# Patient Record
Sex: MALE | NOT HISPANIC OR LATINO | Employment: FULL TIME | URBAN - METROPOLITAN AREA
[De-identification: names, ages, dates, MRNs, and addresses within clinical notes are randomized per-mention and may not be internally consistent; named-entity substitution may affect disease eponyms.]

---

## 2023-10-17 DIAGNOSIS — Z00.00 ENCOUNTER FOR GENERAL ADULT MEDICAL EXAMINATION WITHOUT ABNORMAL FINDINGS: ICD-10-CM

## 2023-10-19 PROBLEM — I10 HYPERTENSION: Status: ACTIVE | Noted: 2023-10-19

## 2023-10-19 PROBLEM — E78.5 HLD (HYPERLIPIDEMIA): Status: ACTIVE | Noted: 2023-10-19

## 2023-10-19 PROBLEM — F32.A DEPRESSION: Status: ACTIVE | Noted: 2023-10-19

## 2023-10-19 PROBLEM — R53.83 FATIGUE: Status: ACTIVE | Noted: 2023-10-19

## 2023-10-19 RX ORDER — FINASTERIDE 1 MG/1
1 TABLET, FILM COATED ORAL DAILY
COMMUNITY
End: 2023-10-20 | Stop reason: SDUPTHER

## 2023-10-19 RX ORDER — ESCITALOPRAM OXALATE 10 MG/1
1 TABLET ORAL DAILY
COMMUNITY
Start: 2016-03-23 | End: 2023-10-20 | Stop reason: SDUPTHER

## 2023-10-19 RX ORDER — LISINOPRIL AND HYDROCHLOROTHIAZIDE 10; 12.5 MG/1; MG/1
1 TABLET ORAL DAILY
COMMUNITY
End: 2023-10-20 | Stop reason: SDUPTHER

## 2023-10-19 RX ORDER — TADALAFIL 10 MG/1
10 TABLET ORAL
COMMUNITY
Start: 2016-10-07 | End: 2023-10-20 | Stop reason: SDUPTHER

## 2023-10-19 RX ORDER — ALBUTEROL SULFATE 90 UG/1
AEROSOL, METERED RESPIRATORY (INHALATION)
COMMUNITY
Start: 2023-05-14 | End: 2023-11-08 | Stop reason: ALTCHOICE

## 2023-10-20 ENCOUNTER — OFFICE VISIT (OUTPATIENT)
Dept: PRIMARY CARE | Facility: CLINIC | Age: 53
End: 2023-10-20
Payer: COMMERCIAL

## 2023-10-20 VITALS
HEART RATE: 97 BPM | BODY MASS INDEX: 26.39 KG/M2 | WEIGHT: 205.6 LBS | DIASTOLIC BLOOD PRESSURE: 70 MMHG | HEIGHT: 74 IN | SYSTOLIC BLOOD PRESSURE: 112 MMHG | TEMPERATURE: 98 F

## 2023-10-20 DIAGNOSIS — Z13.1 SCREENING FOR DIABETES MELLITUS (DM): ICD-10-CM

## 2023-10-20 DIAGNOSIS — Z12.5 ENCOUNTER FOR PROSTATE CANCER SCREENING: ICD-10-CM

## 2023-10-20 DIAGNOSIS — Z13.220 SCREENING, LIPID: ICD-10-CM

## 2023-10-20 DIAGNOSIS — I10 PRIMARY HYPERTENSION: ICD-10-CM

## 2023-10-20 DIAGNOSIS — N52.9 ERECTILE DISORDER: ICD-10-CM

## 2023-10-20 DIAGNOSIS — Z13.1 SCREENING FOR DIABETES MELLITUS: ICD-10-CM

## 2023-10-20 DIAGNOSIS — Z12.11 ENCOUNTER FOR SCREENING FOR MALIGNANT NEOPLASM OF COLON: Primary | ICD-10-CM

## 2023-10-20 DIAGNOSIS — Z13.29 SCREENING FOR THYROID DISORDER: ICD-10-CM

## 2023-10-20 DIAGNOSIS — F32.A DEPRESSION, UNSPECIFIED DEPRESSION TYPE: ICD-10-CM

## 2023-10-20 PROCEDURE — 3078F DIAST BP <80 MM HG: CPT | Performed by: FAMILY MEDICINE

## 2023-10-20 PROCEDURE — 3074F SYST BP LT 130 MM HG: CPT | Performed by: FAMILY MEDICINE

## 2023-10-20 PROCEDURE — 99214 OFFICE O/P EST MOD 30 MIN: CPT | Performed by: FAMILY MEDICINE

## 2023-10-20 PROCEDURE — 1036F TOBACCO NON-USER: CPT | Performed by: FAMILY MEDICINE

## 2023-10-20 RX ORDER — ASPIRIN 81 MG/1
81 TABLET ORAL
COMMUNITY
Start: 2018-09-27

## 2023-10-20 RX ORDER — FINASTERIDE 1 MG/1
1 TABLET, FILM COATED ORAL DAILY
Qty: 90 TABLET | Refills: 1 | Status: SHIPPED | OUTPATIENT
Start: 2023-10-20 | End: 2023-12-04

## 2023-10-20 RX ORDER — ESCITALOPRAM OXALATE 10 MG/1
10 TABLET ORAL DAILY
Qty: 90 TABLET | Refills: 1 | Status: SHIPPED | OUTPATIENT
Start: 2023-10-20 | End: 2024-10-19

## 2023-10-20 RX ORDER — TADALAFIL 20 MG/1
20 TABLET ORAL DAILY PRN
Qty: 30 TABLET | Refills: 10 | Status: SHIPPED | OUTPATIENT
Start: 2023-10-20 | End: 2024-10-19

## 2023-10-20 RX ORDER — BUSPIRONE HYDROCHLORIDE 15 MG/1
15 TABLET ORAL DAILY
Qty: 90 TABLET | Refills: 1 | Status: SHIPPED | OUTPATIENT
Start: 2023-10-20 | End: 2024-10-19

## 2023-10-20 RX ORDER — LISINOPRIL AND HYDROCHLOROTHIAZIDE 10; 12.5 MG/1; MG/1
1 TABLET ORAL DAILY
Qty: 90 TABLET | Refills: 1 | Status: SHIPPED | OUTPATIENT
Start: 2023-10-20 | End: 2024-04-30 | Stop reason: SDUPTHER

## 2023-10-20 RX ORDER — PANTOPRAZOLE SODIUM 40 MG/1
TABLET, DELAYED RELEASE ORAL
COMMUNITY
Start: 2008-12-03 | End: 2023-11-08 | Stop reason: ALTCHOICE

## 2023-10-20 NOTE — PROGRESS NOTES
"    /70 (BP Location: Right arm, Patient Position: Sitting, BP Cuff Size: Adult)   Pulse 97   Temp 36.7 °C (98 °F) (Skin)   Ht 1.88 m (6' 2\")   Wt 93.3 kg (205 lb 9.6 oz)   BMI 26.40 kg/m²     No past medical history on file.    Patient Active Problem List   Diagnosis    Depression    Fatigue    HLD (hyperlipidemia)    Hypertension       Current Outpatient Medications   Medication Sig Dispense Refill    escitalopram (Lexapro) 10 mg tablet Take 1 tablet (10 mg) by mouth once daily.      finasteride (Propecia) 1 mg tablet Take 1 tablet (1 mg) by mouth once daily.      lisinopriL-hydrochlorothiazide 10-12.5 mg tablet Take 1 tablet by mouth once daily.      tadalafil (Cialis) 10 mg tablet Take 1 tablet (10 mg) by mouth. 1 hour before needed      albuterol 90 mcg/actuation inhaler INHALE 2 PUFFS BY MOUTH EVERY 4 TO 6 HOURS AS NEEDED FOR WHEEZE AND FOR SHORTNESS OF BREATH      aspirin 81 mg EC tablet 1 tablet (81 mg).      pantoprazole (Protonix) 40 mg EC tablet Take by mouth.       No current facility-administered medications for this visit.       CC/HPI/ASSESSMENT/PLAN    CC follow-up medicine    HPI patient with a history of hypertension, depression, erectile dysfunction, GERD.  Patient request refills for medication.  Patient will need colonoscopy.  Denies fever chills chest pain.  We discussed adding BuSpar as needed for anxiety which may also help with erectile dysfunction if related to side effect of Lexapro.  Notes overall doing well with medications otherwise.  Blood pressure under good control.  Patient like an order for blood work.  ROS negative some noted above past medical social surgical history is reviewed    Exam calm vital stable eyes no jaundice neck supple no carotid bruit lungs CTA CV RRR Ext no edema skin no rash psych calm pleasant male no psychosis neuro alert and oriented no focal neurologic deficits noted    A/P 1.  Hypertension chronic stable 2 depression chronic stable 3 erectile " dysfunction chronic.  Medications refilled.  BuSpar as ordered.  Blood work is ordered.  He will see GI consultation for endoscopy.  Follow-up 6 months    There are no diagnoses linked to this encounter.

## 2023-10-25 PROBLEM — N52.9 ERECTILE DISORDER: Status: ACTIVE | Noted: 2023-10-25

## 2023-11-08 ENCOUNTER — TELEPHONE (OUTPATIENT)
Dept: PRIMARY CARE | Facility: CLINIC | Age: 53
End: 2023-11-08

## 2023-11-08 ENCOUNTER — OFFICE VISIT (OUTPATIENT)
Dept: GASTROENTEROLOGY | Facility: CLINIC | Age: 53
End: 2023-11-08
Payer: COMMERCIAL

## 2023-11-08 ENCOUNTER — LAB (OUTPATIENT)
Dept: LAB | Facility: LAB | Age: 53
End: 2023-11-08
Payer: COMMERCIAL

## 2023-11-08 VITALS
SYSTOLIC BLOOD PRESSURE: 123 MMHG | HEIGHT: 74 IN | WEIGHT: 206 LBS | BODY MASS INDEX: 26.44 KG/M2 | DIASTOLIC BLOOD PRESSURE: 82 MMHG | HEART RATE: 80 BPM

## 2023-11-08 DIAGNOSIS — Z12.11 ENCOUNTER FOR SCREENING FOR MALIGNANT NEOPLASM OF COLON: ICD-10-CM

## 2023-11-08 DIAGNOSIS — K62.5 RECTAL BLEEDING: Primary | ICD-10-CM

## 2023-11-08 DIAGNOSIS — K62.5 RECTAL BLEEDING: ICD-10-CM

## 2023-11-08 DIAGNOSIS — Z13.1 SCREENING FOR DIABETES MELLITUS: ICD-10-CM

## 2023-11-08 DIAGNOSIS — Z13.1 SCREENING FOR DIABETES MELLITUS (DM): ICD-10-CM

## 2023-11-08 DIAGNOSIS — Z13.29 SCREENING FOR THYROID DISORDER: ICD-10-CM

## 2023-11-08 DIAGNOSIS — Z12.5 ENCOUNTER FOR PROSTATE CANCER SCREENING: ICD-10-CM

## 2023-11-08 DIAGNOSIS — Z13.220 SCREENING, LIPID: ICD-10-CM

## 2023-11-08 LAB
ALBUMIN SERPL BCP-MCNC: 4.3 G/DL (ref 3.4–5)
ALP SERPL-CCNC: 56 U/L (ref 33–120)
ALT SERPL W P-5'-P-CCNC: 12 U/L (ref 10–52)
ANION GAP SERPL CALC-SCNC: 14 MMOL/L (ref 10–20)
AST SERPL W P-5'-P-CCNC: 14 U/L (ref 9–39)
BILIRUB SERPL-MCNC: 0.6 MG/DL (ref 0–1.2)
BUN SERPL-MCNC: 14 MG/DL (ref 6–23)
CALCIUM SERPL-MCNC: 9.3 MG/DL (ref 8.6–10.3)
CHLORIDE SERPL-SCNC: 102 MMOL/L (ref 98–107)
CHOLEST SERPL-MCNC: 156 MG/DL (ref 0–199)
CHOLESTEROL/HDL RATIO: 2.8
CO2 SERPL-SCNC: 28 MMOL/L (ref 21–32)
CREAT SERPL-MCNC: 0.85 MG/DL (ref 0.5–1.3)
CRP SERPL-MCNC: <0.1 MG/DL
ERYTHROCYTE [DISTWIDTH] IN BLOOD BY AUTOMATED COUNT: 11.9 % (ref 11.5–14.5)
ERYTHROCYTE [SEDIMENTATION RATE] IN BLOOD BY WESTERGREN METHOD: 2 MM/H (ref 0–20)
EST. AVERAGE GLUCOSE BLD GHB EST-MCNC: 103 MG/DL
FERRITIN SERPL-MCNC: 46 NG/ML (ref 20–300)
GFR SERPL CREATININE-BSD FRML MDRD: >90 ML/MIN/1.73M*2
GLUCOSE SERPL-MCNC: 89 MG/DL (ref 74–99)
HBA1C MFR BLD: 5.2 %
HCT VFR BLD AUTO: 45.2 % (ref 41–52)
HDLC SERPL-MCNC: 54.8 MG/DL
HGB BLD-MCNC: 15.5 G/DL (ref 13.5–17.5)
LDLC SERPL CALC-MCNC: 89 MG/DL
MCH RBC QN AUTO: 32.8 PG (ref 26–34)
MCHC RBC AUTO-ENTMCNC: 34.3 G/DL (ref 32–36)
MCV RBC AUTO: 96 FL (ref 80–100)
NON HDL CHOLESTEROL: 101 MG/DL (ref 0–149)
NRBC BLD-RTO: 0 /100 WBCS (ref 0–0)
PLATELET # BLD AUTO: 280 X10*3/UL (ref 150–450)
POTASSIUM SERPL-SCNC: 4.1 MMOL/L (ref 3.5–5.3)
PROT SERPL-MCNC: 6.3 G/DL (ref 6.4–8.2)
PSA SERPL-MCNC: 0.69 NG/ML
RBC # BLD AUTO: 4.72 X10*6/UL (ref 4.5–5.9)
SODIUM SERPL-SCNC: 140 MMOL/L (ref 136–145)
TRIGL SERPL-MCNC: 60 MG/DL (ref 0–149)
TSH SERPL-ACNC: 0.92 MIU/L (ref 0.44–3.98)
VLDL: 12 MG/DL (ref 0–40)
WBC # BLD AUTO: 5.9 X10*3/UL (ref 4.4–11.3)

## 2023-11-08 PROCEDURE — 82728 ASSAY OF FERRITIN: CPT

## 2023-11-08 PROCEDURE — 86140 C-REACTIVE PROTEIN: CPT

## 2023-11-08 PROCEDURE — 80053 COMPREHEN METABOLIC PANEL: CPT

## 2023-11-08 PROCEDURE — 84153 ASSAY OF PSA TOTAL: CPT

## 2023-11-08 PROCEDURE — 3079F DIAST BP 80-89 MM HG: CPT | Performed by: REGISTERED NURSE

## 2023-11-08 PROCEDURE — 83036 HEMOGLOBIN GLYCOSYLATED A1C: CPT

## 2023-11-08 PROCEDURE — 99203 OFFICE O/P NEW LOW 30 MIN: CPT | Performed by: REGISTERED NURSE

## 2023-11-08 PROCEDURE — 80061 LIPID PANEL: CPT

## 2023-11-08 PROCEDURE — 3074F SYST BP LT 130 MM HG: CPT | Performed by: REGISTERED NURSE

## 2023-11-08 PROCEDURE — 36415 COLL VENOUS BLD VENIPUNCTURE: CPT

## 2023-11-08 PROCEDURE — 85652 RBC SED RATE AUTOMATED: CPT

## 2023-11-08 PROCEDURE — 85027 COMPLETE CBC AUTOMATED: CPT

## 2023-11-08 PROCEDURE — 84443 ASSAY THYROID STIM HORMONE: CPT

## 2023-11-08 PROCEDURE — 1036F TOBACCO NON-USER: CPT | Performed by: REGISTERED NURSE

## 2023-11-08 RX ORDER — SODIUM, POTASSIUM,MAG SULFATES 17.5-3.13G
1 SOLUTION, RECONSTITUTED, ORAL ORAL EVERY 12 HOURS
Qty: 2 EACH | Refills: 0 | Status: SHIPPED | OUTPATIENT
Start: 2023-11-08

## 2023-11-08 ASSESSMENT — ENCOUNTER SYMPTOMS
CONSTIPATION: 0
MYALGIAS: 0
UNEXPECTED WEIGHT CHANGE: 0
RECTAL PAIN: 0
ABDOMINAL DISTENTION: 0
NERVOUS/ANXIOUS: 0
ANAL BLEEDING: 0
ENDOCRINE NEGATIVE: 1
JOINT SWELLING: 0
APPETITE CHANGE: 0
DIFFICULTY URINATING: 0
DIARRHEA: 0
EYE PAIN: 0
ARTHRALGIAS: 0
EYES NEGATIVE: 1
ABDOMINAL PAIN: 0
NAUSEA: 0
SHORTNESS OF BREATH: 0
COUGH: 0
VOMITING: 0
BLOOD IN STOOL: 1

## 2023-11-08 NOTE — PROGRESS NOTES
REASON FOR VISIT:  Patient presents for colon cancer screening and rectal bleeding. Denies mucus. Endorses clotting. Moves bowels every day.     HPI:  Sha Harris is a 53 y.o. male who presents for hematochezia since 2014.  States he had a colonoscopy at that time that showed hemorrhoids.  He endorses intermittent hematochezia and recently had an episode of blood that saturated through his underwear and pants.  He takes low-dose aspirin every day and occasionally takes ibuprofen or Aleve.      He denies rectal pain, abdominal pain, bloating, epigastric pain, early satiety, reflux, dysphagia nausea or vomiting.  His weight and appetite are stable.  He moves his bowels daily.  No change in bowel pattern.  He has occasional loose stool depending what he eats.  He denies constipation, urgency, nocturnal bowel movements or melena.   He is a vegetarian and exercises regularly.    Med list notable for - denies NSAID use other than ASA 81mg or blood thinners     Labs notable for  -last blood tests checked in 2020.  Patient has blood tests orders in the system and will complete them today    No fhx of CRC, other GI malignancies, IBD or celiac disease    Prev endoscopic eval:     >>Colon 6/5/2014 - Dr. Blair --moderate nonbleeding internal hemorrhoids were found.  No prolapse.  The examination was otherwise normal.  Advised to use Anusol rectal suppository for 10 days.    REVIEW OF SYSTEMS    Review of Systems   Constitutional:  Negative for appetite change and unexpected weight change.   HENT:  Negative for mouth sores.    Eyes: Negative.  Negative for pain and visual disturbance.   Respiratory:  Negative for cough and shortness of breath.    Cardiovascular:  Negative for chest pain.   Gastrointestinal:  Positive for blood in stool. Negative for abdominal distention, abdominal pain, anal bleeding, constipation, diarrhea, nausea, rectal pain and vomiting.   Endocrine: Negative.    Genitourinary:  Negative for difficulty  urinating.   Musculoskeletal:  Negative for arthralgias, joint swelling and myalgias.   Skin:  Negative for rash.   Allergic/Immunologic: Negative for environmental allergies and food allergies.   Psychiatric/Behavioral:  The patient is not nervous/anxious.         No Known Allergies    No past medical history on file.    Past Surgical History:   Procedure Laterality Date    APPENDECTOMY  03/08/2017    Appendectomy    TONSILLECTOMY  03/08/2017    Tonsillectomy       Family History   Problem Relation Name Age of Onset    Other (drug overdose) Mother      Other (hypertelorism) Mother      Other (drug overdose) Father      Leukemia Father      Leukemia Paternal Grandfather      Leukemia Other         Social History     Tobacco Use    Smoking status: Former     Types: Cigarettes    Smokeless tobacco: Never   Substance Use Topics    Alcohol use: Not on file       Current Outpatient Medications   Medication Sig Dispense Refill    albuterol 90 mcg/actuation inhaler INHALE 2 PUFFS BY MOUTH EVERY 4 TO 6 HOURS AS NEEDED FOR WHEEZE AND FOR SHORTNESS OF BREATH      aspirin 81 mg EC tablet 1 tablet (81 mg).      busPIRone (Buspar) 15 mg tablet Take 1 tablet (15 mg) by mouth once daily. 90 tablet 1    escitalopram (Lexapro) 10 mg tablet TAKE 1 TABLET DAILY 90 tablet 1    escitalopram (Lexapro) 10 mg tablet Take 1 tablet (10 mg) by mouth once daily. 90 tablet 1    finasteride (Propecia) 1 mg tablet Take 1 tablet (1 mg) by mouth once daily. 90 tablet 1    lisinopriL-hydrochlorothiazide 10-12.5 mg tablet Take 1 tablet by mouth once daily. 90 tablet 1    pantoprazole (Protonix) 40 mg EC tablet Take by mouth.      tadalafil (Cialis) 20 mg tablet Take 1 tablet (20 mg) by mouth once daily as needed for erectile dysfunction. 1 hour before needed 30 tablet 10     No current facility-administered medications for this visit.       PHYSICAL EXAM:  There were no vitals taken for this visit.     Physical Exam  Constitutional:       Appearance:  Normal appearance.   HENT:      Head: Normocephalic and atraumatic.      Nose: Nose normal.   Eyes:      Pupils: Pupils are equal, round, and reactive to light.   Cardiovascular:      Rate and Rhythm: Normal rate and regular rhythm.   Pulmonary:      Effort: Pulmonary effort is normal.      Breath sounds: Normal breath sounds.   Abdominal:      General: Abdomen is flat. Bowel sounds are normal.      Palpations: Abdomen is soft.   Genitourinary:     Comments: Rectal exam -nonthrombosed external hemorrhoids, palpable internal hemorrhoids.  No evidence of masses, fissures or fistula  Musculoskeletal:         General: Normal range of motion.      Cervical back: Normal range of motion and neck supple.   Skin:     General: Skin is warm and dry.   Neurological:      Mental Status: He is alert and oriented to person, place, and time.   Psychiatric:         Mood and Affect: Mood normal.         Behavior: Behavior normal.        ASSESSMENT    Here for evaluation of rectal bleeding.  We will schedule you for a colonoscopy to further evaluate your symptoms.    PLAN     Schedule Colonoscopy.  My office will send in a prescription for a bowel prep for you to your pharmacy.  Please follow the prep instructions closely.  You may have clear liquids only the day before the procedure. You cannot have anything red or purple. You may have popsicles, hard candy or gum.  You will need a .  You will receive sedation for the procedure by an an anesthesia professional to keep you comfortable during the procedure.     2.   Check blood tests today   3.   Follow-up in the GI clinic after the procedure or per physician recommendation            Date: 11/8/2023  Time: 8:00 AM

## 2023-11-08 NOTE — PATIENT INSTRUCTIONS
ASSESSMENT    Here for evaluation of rectal bleeding.  We will schedule you for a colonoscopy to further evaluate your symptoms.    PLAN     Schedule Colonoscopy.  My office will send in a prescription for a bowel prep for you to your pharmacy.  Please follow the prep instructions closely.  You may have clear liquids only the day before the procedure. You cannot have anything red or purple. You may have popsicles, hard candy or gum.  You will need a .  You will receive sedation for the procedure by an an anesthesia professional to keep you comfortable during the procedure.     2.   Check blood tests today   3.   Follow-up in the GI clinic after the procedure or per physician recommendation

## 2023-11-30 RX ORDER — FINASTERIDE 1 MG/1
1 TABLET, FILM COATED ORAL DAILY
Qty: 90 TABLET | Refills: 1 | OUTPATIENT
Start: 2023-11-30

## 2023-12-03 DIAGNOSIS — N52.9 ERECTILE DISORDER: ICD-10-CM

## 2023-12-04 ENCOUNTER — ANESTHESIA EVENT (OUTPATIENT)
Dept: GASTROENTEROLOGY | Facility: EXTERNAL LOCATION | Age: 53
End: 2023-12-04

## 2023-12-04 RX ORDER — FINASTERIDE 1 MG/1
1 TABLET, FILM COATED ORAL DAILY
Qty: 90 TABLET | Refills: 0 | Status: SHIPPED | OUTPATIENT
Start: 2023-12-04 | End: 2024-03-11

## 2023-12-12 ENCOUNTER — ANESTHESIA (OUTPATIENT)
Dept: GASTROENTEROLOGY | Facility: EXTERNAL LOCATION | Age: 53
End: 2023-12-12

## 2023-12-12 ENCOUNTER — HOSPITAL ENCOUNTER (OUTPATIENT)
Dept: GASTROENTEROLOGY | Facility: EXTERNAL LOCATION | Age: 53
Discharge: HOME | End: 2023-12-12
Payer: COMMERCIAL

## 2023-12-12 VITALS
WEIGHT: 195 LBS | HEIGHT: 74 IN | SYSTOLIC BLOOD PRESSURE: 99 MMHG | TEMPERATURE: 98.1 F | DIASTOLIC BLOOD PRESSURE: 74 MMHG | BODY MASS INDEX: 25.03 KG/M2 | OXYGEN SATURATION: 98 % | RESPIRATION RATE: 19 BRPM | HEART RATE: 88 BPM

## 2023-12-12 DIAGNOSIS — Z12.11 ENCOUNTER FOR SCREENING FOR MALIGNANT NEOPLASM OF COLON: Primary | ICD-10-CM

## 2023-12-12 DIAGNOSIS — K62.5 RECTAL BLEEDING: ICD-10-CM

## 2023-12-12 PROCEDURE — 0753T DGTZ GLS MCRSCP SLD LEVEL IV: CPT

## 2023-12-12 PROCEDURE — 88305 TISSUE EXAM BY PATHOLOGIST: CPT | Performed by: PATHOLOGY

## 2023-12-12 PROCEDURE — 45380 COLONOSCOPY AND BIOPSY: CPT | Performed by: INTERNAL MEDICINE

## 2023-12-12 PROCEDURE — 88305 TISSUE EXAM BY PATHOLOGIST: CPT

## 2023-12-12 RX ORDER — SODIUM CHLORIDE 9 MG/ML
20 INJECTION, SOLUTION INTRAVENOUS CONTINUOUS
Status: DISCONTINUED | OUTPATIENT
Start: 2023-12-12 | End: 2023-12-13 | Stop reason: HOSPADM

## 2023-12-12 RX ORDER — PROPOFOL 10 MG/ML
INJECTION, EMULSION INTRAVENOUS AS NEEDED
Status: DISCONTINUED | OUTPATIENT
Start: 2023-12-12 | End: 2023-12-12

## 2023-12-12 RX ADMIN — PROPOFOL 100 MG: 10 INJECTION, EMULSION INTRAVENOUS at 12:28

## 2023-12-12 RX ADMIN — PROPOFOL 50 MG: 10 INJECTION, EMULSION INTRAVENOUS at 12:41

## 2023-12-12 RX ADMIN — PROPOFOL 50 MG: 10 INJECTION, EMULSION INTRAVENOUS at 12:37

## 2023-12-12 RX ADMIN — PROPOFOL 50 MG: 10 INJECTION, EMULSION INTRAVENOUS at 12:31

## 2023-12-12 RX ADMIN — PROPOFOL 50 MG: 10 INJECTION, EMULSION INTRAVENOUS at 12:33

## 2023-12-12 RX ADMIN — SODIUM CHLORIDE: 9 INJECTION, SOLUTION INTRAVENOUS at 12:22

## 2023-12-12 SDOH — HEALTH STABILITY: MENTAL HEALTH: CURRENT SMOKER: 0

## 2023-12-12 ASSESSMENT — PAIN SCALES - GENERAL
PAINLEVEL_OUTOF10: 0 - NO PAIN
PAIN_LEVEL: 0
PAINLEVEL_OUTOF10: 0 - NO PAIN

## 2023-12-12 ASSESSMENT — PAIN - FUNCTIONAL ASSESSMENT
PAIN_FUNCTIONAL_ASSESSMENT: 0-10

## 2023-12-12 ASSESSMENT — COLUMBIA-SUICIDE SEVERITY RATING SCALE - C-SSRS
2. HAVE YOU ACTUALLY HAD ANY THOUGHTS OF KILLING YOURSELF?: NO
1. IN THE PAST MONTH, HAVE YOU WISHED YOU WERE DEAD OR WISHED YOU COULD GO TO SLEEP AND NOT WAKE UP?: NO
6. HAVE YOU EVER DONE ANYTHING, STARTED TO DO ANYTHING, OR PREPARED TO DO ANYTHING TO END YOUR LIFE?: NO

## 2023-12-12 NOTE — ANESTHESIA PREPROCEDURE EVALUATION
Patient: Sha Harris    Procedure Information       Date/Time: 12/12/23 1230    Scheduled providers: Colton Huizar MD    Procedure: COLONOSCOPY    Location: [facility]            Relevant Problems   Cardiovascular   (+) HLD (hyperlipidemia)   (+) Hypertension      Neuro/Psych   (+) Depression       Clinical information reviewed:   Tobacco  Allergies  Meds   Med Hx  Surg Hx   Fam Hx  Soc Hx        NPO Detail:  NPO/Void Status  Date of Last Liquid: 12/12/23  Time of Last Liquid: 0700  Date of Last Solid: 12/10/23  Time of Last Solid: 1900  Last Intake Type: Clear fluids         Physical Exam    Airway  Mallampati: II  TM distance: >3 FB  Neck ROM: full     Cardiovascular - normal exam  Rhythm: regular  Rate: normal     Dental - normal exam     Pulmonary - normal exam  Breath sounds clear to auscultation     Abdominal - normal exam         Anesthesia Plan    ASA 2     MAC     The patient is not a current smoker.  Education provided regarding risk of obstructive sleep apnea.  intravenous induction   Anesthetic plan and risks discussed with patient.  Use of blood products discussed with who consented to blood products.    Plan discussed with CRNA.

## 2023-12-12 NOTE — DISCHARGE INSTRUCTIONS
.Patient Instructions Post Procedure      The anesthetics, sedatives or narcotics which were given to you today will be acting in your body for the next 24 hours, so you might feel a little sleepy or groggy.  This feeling should slowly wear off. Carefully read and follow the instructions.     You received sedation today:  - Do not drive or operate any machinery or power tools of any kind.   - No alcoholic beverages today, not even beer or wine.  - Do not make any important decisions or sign any legal documents.  - No over the counter medications that contain alcohol or that may cause drowsiness.    While it is common to experience mild to moderate abdominal distention, gas, or belching after your procedure, if any of these symptoms occur following discharge from the GI Lab or within one week of having your procedure, call the [facility] to be advised whether a visit to your nearest Urgent Care or Emergency Department is indicated.  Take this paper with you if you go.   - If you develop an allergic reaction to the medications that were given during your procedure such as difficulty breathing, rash, hives, severe nausea, vomiting or lightheadedness.  - If you experience chest pain, shortness of breath, severe abdominal pain, fevers and chills.  -If you develop signs and symptoms of bleeding such as blood in your spit, if your stools turn black, tarry, or bloody  - If you have not urinated within 8 hours following your procedure.  - If your IV site becomes painful, red, inflamed, or looks infected.    If you received a biopsy/polypectomy/sphincterotomy the following instructions apply below:  __ Do not use Aspirin containing products, non-steroidal medications or anti-coagulants for one week following your procedure. (Examples of these types of medications are: Advil, Arthrotec, Aleve, Coumadin, Ecotrin, Heparin, Ibuprofen, Indocin, Motrin, Naprosyn, Nuprin, Plavix, Vioxx, and Voltarin, or their generic  forms.  This list is not all-inclusive.  Check with your physician or pharmacist before resuming medications.)   __ Eat a soft diet today.  Avoid foods that are poorly digested for the next 24 hours.  These foods would include: nuts, beans, lettuce, red meats, and fried foods. Start with liquids and advance your diet as tolerated, gradually work up to eating solids.   __ Do not have a Barium Study or Enema for one week.    Your physician recommends the additional following instructions:    -You have a contact number available for emergencies. The signs and symptoms of potential delayed complications were discussed with you. You may return to normal activities tomorrow.  -Resume your previous diet or other if specified.  -Continue your present medications.   -We are waiting for your pathology results, if applicable.  -The findings and recommendations have been discussed with you and/or family.  - Please see Medication Reconciliation Form for new medication/medications prescribed.     If you experience any problems or have any questions following discharge from the GI Lab, please call: [phone] from 7 am- 4:30 pm.  In the event of an emergency please go to the closest Emergency Department or call Dr. Huizar [phone]

## 2023-12-12 NOTE — ANESTHESIA POSTPROCEDURE EVALUATION
Patient: Sha Harris    Procedure Summary       Date: 12/12/23 Room / Location: [facility]    Anesthesia Start: 1225 Anesthesia Stop: 1247    Procedure: COLONOSCOPY Diagnosis:       Encounter for screening for malignant neoplasm of colon      Rectal bleeding      Encounter for screening for malignant neoplasm of colon    Scheduled Providers: Colton Huizar MD Responsible Provider: ROSI Avelar    Anesthesia Type: MAC ASA Status: 2            Anesthesia Type: MAC    Vitals Value Taken Time   /70 12/12/23 1247   Temp 36.6 12/12/23 1247   Pulse 95 12/12/23 1247   Resp 16 12/12/23 1247   SpO2 96 12/12/23 1247       Anesthesia Post Evaluation    Patient location during evaluation: bedside  Patient participation: waiting for patient participation  Level of consciousness: responsive to verbal stimuli  Pain score: 0  Pain management: adequate  Airway patency: patent  Cardiovascular status: acceptable  Respiratory status: acceptable  Hydration status: acceptable  Postoperative Nausea and Vomiting: none      There were no known notable events for this encounter.

## 2023-12-12 NOTE — PRE-SEDATION DOCUMENTATION
Patient: Sha Harris  MRN: [id number]    Pre-sedation Evaluation:  Sedation necessary for: Analgesia  Requesting service: gi    History of Present Illness:  screening colon, rectal bleeding     Past Medical History:   Diagnosis Date    Anxiety     Hypertension        Principle problems:  Patient Active Problem List    Diagnosis Date Noted    Erectile disorder 10/25/2023    Depression 10/19/2023    Fatigue 10/19/2023    HLD (hyperlipidemia) 10/19/2023    Hypertension 10/19/2023     Allergies:  No Known Allergies  PTA/Current Medications:  (Not in a hospital admission)    Current Outpatient Medications   Medication Sig Dispense Refill    aspirin 81 mg EC tablet 1 tablet (81 mg).      busPIRone (Buspar) 15 mg tablet Take 1 tablet (15 mg) by mouth once daily. 90 tablet 1    escitalopram (Lexapro) 10 mg tablet Take 1 tablet (10 mg) by mouth once daily. 90 tablet 1    finasteride (Propecia) 1 mg tablet TAKE 1 TABLET BY MOUTH EVERY DAY 90 tablet 0    lisinopriL-hydrochlorothiazide 10-12.5 mg tablet Take 1 tablet by mouth once daily. 90 tablet 1    sodium,potassium,mag sulfates (Suprep Bowel Prep Kit) 17.5-3.13-1.6 gram recon soln solution Take 1 bottle by mouth every 12 hours. 2 each 0    tadalafil (Cialis) 20 mg tablet Take 1 tablet (20 mg) by mouth once daily as needed for erectile dysfunction. 1 hour before needed 30 tablet 10     No current facility-administered medications for this encounter.     Past Surgical History:   has a past surgical history that includes Appendectomy (03/08/2017) and Tonsillectomy (03/08/2017).    Recent sedation/surgery (24 hours) No    Review of Systems:  Please check all that apply: No significant medical history        NPO guidelines met: Yes    Physical Exam    Airway  Mallampati: I     Cardiovascular - normal exam     Dental - normal exam     Pulmonary - normal exam         Plan    ASA 1     Deep

## 2023-12-21 ENCOUNTER — TELEPHONE (OUTPATIENT)
Dept: GASTROENTEROLOGY | Facility: EXTERNAL LOCATION | Age: 53
End: 2023-12-21
Payer: COMMERCIAL

## 2023-12-21 LAB
LABORATORY COMMENT REPORT: NORMAL
PATH REPORT.FINAL DX SPEC: NORMAL
PATH REPORT.GROSS SPEC: NORMAL
PATH REPORT.TOTAL CANCER: NORMAL

## 2024-04-09 DIAGNOSIS — F32.A DEPRESSION, UNSPECIFIED DEPRESSION TYPE: ICD-10-CM

## 2024-04-09 DIAGNOSIS — I10 PRIMARY HYPERTENSION: ICD-10-CM

## 2024-04-10 RX ORDER — ESCITALOPRAM OXALATE 10 MG/1
10 TABLET ORAL DAILY
Qty: 90 TABLET | Refills: 1 | OUTPATIENT
Start: 2024-04-10

## 2024-04-10 RX ORDER — BUSPIRONE HYDROCHLORIDE 15 MG/1
15 TABLET ORAL DAILY
Qty: 90 TABLET | Refills: 1 | OUTPATIENT
Start: 2024-04-10

## 2024-04-10 RX ORDER — LISINOPRIL AND HYDROCHLOROTHIAZIDE 10; 12.5 MG/1; MG/1
1 TABLET ORAL DAILY
Qty: 90 TABLET | Refills: 1 | OUTPATIENT
Start: 2024-04-10

## 2024-04-30 DIAGNOSIS — I10 PRIMARY HYPERTENSION: ICD-10-CM

## 2024-05-02 RX ORDER — LISINOPRIL AND HYDROCHLOROTHIAZIDE 10; 12.5 MG/1; MG/1
1 TABLET ORAL DAILY
Qty: 30 TABLET | Refills: 0 | Status: SHIPPED | OUTPATIENT
Start: 2024-05-02 | End: 2025-05-02

## 2024-05-27 DIAGNOSIS — I10 PRIMARY HYPERTENSION: ICD-10-CM

## 2024-05-29 RX ORDER — LISINOPRIL AND HYDROCHLOROTHIAZIDE 10; 12.5 MG/1; MG/1
1 TABLET ORAL DAILY
Qty: 90 TABLET | Refills: 1 | OUTPATIENT
Start: 2024-05-29

## 2024-06-26 ENCOUNTER — TELEPHONE (OUTPATIENT)
Dept: PRIMARY CARE | Facility: CLINIC | Age: 54
End: 2024-06-26
Payer: COMMERCIAL

## 2024-06-26 DIAGNOSIS — F32.A DEPRESSION, UNSPECIFIED DEPRESSION TYPE: ICD-10-CM

## 2024-06-26 DIAGNOSIS — I10 PRIMARY HYPERTENSION: ICD-10-CM

## 2024-06-27 DIAGNOSIS — F32.A DEPRESSION, UNSPECIFIED DEPRESSION TYPE: ICD-10-CM

## 2024-06-27 RX ORDER — LISINOPRIL AND HYDROCHLOROTHIAZIDE 10; 12.5 MG/1; MG/1
1 TABLET ORAL DAILY
Qty: 30 TABLET | Refills: 0 | Status: SHIPPED | OUTPATIENT
Start: 2024-06-27 | End: 2025-06-27

## 2024-06-27 RX ORDER — BUSPIRONE HYDROCHLORIDE 15 MG/1
15 TABLET ORAL DAILY
Qty: 30 TABLET | Refills: 0 | Status: SHIPPED | OUTPATIENT
Start: 2024-06-27 | End: 2024-06-28

## 2024-06-28 RX ORDER — BUSPIRONE HYDROCHLORIDE 15 MG/1
15 TABLET ORAL DAILY
Qty: 30 TABLET | Refills: 0 | Status: SHIPPED | OUTPATIENT
Start: 2024-06-28

## 2024-07-05 DIAGNOSIS — N52.9 ERECTILE DISORDER: ICD-10-CM

## 2024-07-08 RX ORDER — FINASTERIDE 1 MG/1
1 TABLET, FILM COATED ORAL DAILY
Qty: 90 TABLET | Refills: 0 | OUTPATIENT
Start: 2024-07-08

## 2024-07-12 DIAGNOSIS — N52.9 ERECTILE DISORDER: ICD-10-CM

## 2024-07-12 RX ORDER — FINASTERIDE 1 MG/1
1 TABLET, FILM COATED ORAL DAILY
Qty: 30 TABLET | Refills: 0 | Status: SHIPPED | OUTPATIENT
Start: 2024-07-12 | End: 2024-08-11

## 2024-07-15 RX ORDER — FINASTERIDE 1 MG/1
1 TABLET, FILM COATED ORAL DAILY
Qty: 90 TABLET | OUTPATIENT
Start: 2024-07-15

## 2024-07-23 DIAGNOSIS — I10 PRIMARY HYPERTENSION: ICD-10-CM

## 2024-07-24 RX ORDER — LISINOPRIL AND HYDROCHLOROTHIAZIDE 10; 12.5 MG/1; MG/1
1 TABLET ORAL DAILY
Qty: 90 TABLET | Refills: 1 | OUTPATIENT
Start: 2024-07-24

## 2024-08-02 DIAGNOSIS — I10 PRIMARY HYPERTENSION: ICD-10-CM

## 2024-08-02 DIAGNOSIS — N52.9 ERECTILE DISORDER: ICD-10-CM

## 2024-08-02 RX ORDER — LISINOPRIL AND HYDROCHLOROTHIAZIDE 10; 12.5 MG/1; MG/1
1 TABLET ORAL DAILY
Qty: 30 TABLET | Refills: 0 | Status: SHIPPED | OUTPATIENT
Start: 2024-08-02 | End: 2025-08-02

## 2024-08-02 RX ORDER — FINASTERIDE 1 MG/1
1 TABLET, FILM COATED ORAL DAILY
Qty: 30 TABLET | Refills: 0 | Status: SHIPPED | OUTPATIENT
Start: 2024-08-02 | End: 2024-09-01

## 2024-08-30 DIAGNOSIS — F32.A DEPRESSION, UNSPECIFIED DEPRESSION TYPE: ICD-10-CM

## 2024-08-30 DIAGNOSIS — I10 PRIMARY HYPERTENSION: ICD-10-CM

## 2024-08-30 RX ORDER — BUSPIRONE HYDROCHLORIDE 15 MG/1
15 TABLET ORAL DAILY
Qty: 30 TABLET | Refills: 0 | Status: SHIPPED | OUTPATIENT
Start: 2024-08-30

## 2024-08-30 RX ORDER — LISINOPRIL AND HYDROCHLOROTHIAZIDE 10; 12.5 MG/1; MG/1
1 TABLET ORAL DAILY
Qty: 30 TABLET | Refills: 0 | Status: SHIPPED | OUTPATIENT
Start: 2024-08-30

## 2024-09-18 ENCOUNTER — APPOINTMENT (OUTPATIENT)
Dept: PRIMARY CARE | Facility: CLINIC | Age: 54
End: 2024-09-18
Payer: COMMERCIAL

## 2024-09-18 VITALS
WEIGHT: 198.2 LBS | HEART RATE: 104 BPM | OXYGEN SATURATION: 98 % | HEIGHT: 74 IN | DIASTOLIC BLOOD PRESSURE: 70 MMHG | SYSTOLIC BLOOD PRESSURE: 112 MMHG | BODY MASS INDEX: 25.44 KG/M2

## 2024-09-18 DIAGNOSIS — F32.A DEPRESSION, UNSPECIFIED DEPRESSION TYPE: ICD-10-CM

## 2024-09-18 DIAGNOSIS — N52.9 ERECTILE DISORDER: ICD-10-CM

## 2024-09-18 DIAGNOSIS — I10 PRIMARY HYPERTENSION: Primary | ICD-10-CM

## 2024-09-18 DIAGNOSIS — E78.5 HYPERLIPIDEMIA, UNSPECIFIED HYPERLIPIDEMIA TYPE: ICD-10-CM

## 2024-09-18 DIAGNOSIS — Z12.5 ENCOUNTER FOR PROSTATE CANCER SCREENING: ICD-10-CM

## 2024-09-18 PROCEDURE — 3008F BODY MASS INDEX DOCD: CPT | Performed by: FAMILY MEDICINE

## 2024-09-18 PROCEDURE — 99214 OFFICE O/P EST MOD 30 MIN: CPT | Performed by: FAMILY MEDICINE

## 2024-09-18 PROCEDURE — 3078F DIAST BP <80 MM HG: CPT | Performed by: FAMILY MEDICINE

## 2024-09-18 PROCEDURE — 3074F SYST BP LT 130 MM HG: CPT | Performed by: FAMILY MEDICINE

## 2024-09-18 PROCEDURE — 1036F TOBACCO NON-USER: CPT | Performed by: FAMILY MEDICINE

## 2024-09-18 RX ORDER — ESCITALOPRAM OXALATE 10 MG/1
10 TABLET ORAL DAILY
Qty: 90 TABLET | Refills: 1 | Status: SHIPPED | OUTPATIENT
Start: 2024-09-18 | End: 2025-09-18

## 2024-09-18 RX ORDER — FINASTERIDE 1 MG/1
1 TABLET, FILM COATED ORAL
COMMUNITY
Start: 2024-08-30 | End: 2024-09-18 | Stop reason: SDUPTHER

## 2024-09-18 RX ORDER — TADALAFIL 20 MG/1
20 TABLET ORAL DAILY PRN
Qty: 30 TABLET | Refills: 10 | Status: SHIPPED | OUTPATIENT
Start: 2024-09-18 | End: 2025-09-18

## 2024-09-18 RX ORDER — BUSPIRONE HYDROCHLORIDE 15 MG/1
15 TABLET ORAL DAILY
Qty: 90 TABLET | Refills: 1 | Status: SHIPPED | OUTPATIENT
Start: 2024-09-18 | End: 2025-09-18

## 2024-09-18 RX ORDER — LISINOPRIL AND HYDROCHLOROTHIAZIDE 10; 12.5 MG/1; MG/1
1 TABLET ORAL DAILY
Qty: 90 TABLET | Refills: 1 | Status: SHIPPED | OUTPATIENT
Start: 2024-09-18 | End: 2025-09-18

## 2024-09-18 RX ORDER — FINASTERIDE 1 MG/1
1 TABLET, FILM COATED ORAL
Qty: 90 TABLET | Refills: 1 | Status: SHIPPED | OUTPATIENT
Start: 2024-09-18 | End: 2025-09-18

## 2024-09-18 ASSESSMENT — PATIENT HEALTH QUESTIONNAIRE - PHQ9
SUM OF ALL RESPONSES TO PHQ9 QUESTIONS 1 & 2: 0
1. LITTLE INTEREST OR PLEASURE IN DOING THINGS: NOT AT ALL
2. FEELING DOWN, DEPRESSED OR HOPELESS: NOT AT ALL

## 2024-09-18 ASSESSMENT — ENCOUNTER SYMPTOMS
LOSS OF SENSATION IN FEET: 0
OCCASIONAL FEELINGS OF UNSTEADINESS: 0
DEPRESSION: 0

## 2024-09-18 NOTE — PROGRESS NOTES
"    /70 (BP Location: Left arm)   Pulse 104   Ht 1.88 m (6' 2\")   Wt 89.9 kg (198 lb 3.2 oz)   SpO2 98%   BMI 25.45 kg/m²     Past Medical History:   Diagnosis Date    Anxiety     Hypertension        Patient Active Problem List   Diagnosis    Depression    Fatigue    HLD (hyperlipidemia)    Hypertension    Erectile disorder       Current Outpatient Medications   Medication Sig Dispense Refill    aspirin 81 mg EC tablet 1 tablet (81 mg).      busPIRone (Buspar) 15 mg tablet Take 1 tablet (15 mg) by mouth once daily. 30 tablet 0    escitalopram (Lexapro) 10 mg tablet Take 1 tablet (10 mg) by mouth once daily. 90 tablet 1    finasteride (Propecia) 1 mg tablet Take 1 tablet (1 mg) by mouth early in the morning..      lisinopriL-hydrochlorothiazide 10-12.5 mg tablet TAKE 1 TABLET BY MOUTH EVERY DAY 30 tablet 0    tadalafil (Cialis) 20 mg tablet Take 1 tablet (20 mg) by mouth once daily as needed for erectile dysfunction. 1 hour before needed 30 tablet 10    sodium,potassium,mag sulfates (Suprep Bowel Prep Kit) 17.5-3.13-1.6 gram recon soln solution Take 1 bottle by mouth every 12 hours. (Patient not taking: Reported on 9/18/2024) 2 each 0     No current facility-administered medications for this visit.       CC/HPI/ASSESSMENT/PLAN    CC follow-up medication    HPI patient with a history of hypertension depression hyperlipidemia erectile dysfunction.  Patient notes he been feeling well.  He will need blood work in the near future.  Blood pressure is under good control.  Denies headache fever chest pain palpitation short of breath abdominal pain.  Patient notes he recently moved to [place] but will be coming back on a regular basis.  ROS negative set noted above past medical social surgical history is reviewed.  Patient notes he is doing well with his current medications requesting refills.  Patient denies side effect medication    Exam calm eyes no jaundice neck supple lungs CTA CV RRR Ext no edema skin no rash " neuro alert oriented no focal neurologic deficits noted.  Psych: Pleasant male no psychosis    A/P 1 hypertension stable 2 depression stable 3 hyperlipidemia chronic.  4 erectile disorder.  Medications are refilled.  Blood work is ordered.  Follow-up 6 months.    There are no diagnoses linked to this encounter.

## 2024-10-13 DIAGNOSIS — N52.9 ERECTILE DISORDER: ICD-10-CM

## 2024-10-14 RX ORDER — TADALAFIL 20 MG/1
TABLET ORAL
Qty: 30 TABLET | Refills: 10 | Status: SHIPPED | OUTPATIENT
Start: 2024-10-14

## 2025-04-11 ENCOUNTER — APPOINTMENT (OUTPATIENT)
Dept: PRIMARY CARE | Facility: CLINIC | Age: 55
End: 2025-04-11
Payer: COMMERCIAL

## 2025-04-11 VITALS
HEIGHT: 74 IN | SYSTOLIC BLOOD PRESSURE: 120 MMHG | TEMPERATURE: 98.4 F | WEIGHT: 206 LBS | DIASTOLIC BLOOD PRESSURE: 78 MMHG | HEART RATE: 83 BPM | BODY MASS INDEX: 26.44 KG/M2

## 2025-04-11 DIAGNOSIS — Z12.5 ENCOUNTER FOR PROSTATE CANCER SCREENING: ICD-10-CM

## 2025-04-11 DIAGNOSIS — I10 PRIMARY HYPERTENSION: ICD-10-CM

## 2025-04-11 DIAGNOSIS — E55.9 VITAMIN D DEFICIENCY: ICD-10-CM

## 2025-04-11 DIAGNOSIS — E78.5 HYPERLIPIDEMIA, UNSPECIFIED HYPERLIPIDEMIA TYPE: ICD-10-CM

## 2025-04-11 DIAGNOSIS — Z23 IMMUNIZATION DUE: ICD-10-CM

## 2025-04-11 DIAGNOSIS — Z00.00 ROUTINE GENERAL MEDICAL EXAMINATION AT A HEALTH CARE FACILITY: Primary | ICD-10-CM

## 2025-04-11 DIAGNOSIS — F32.A DEPRESSION, UNSPECIFIED DEPRESSION TYPE: ICD-10-CM

## 2025-04-11 PROCEDURE — 90715 TDAP VACCINE 7 YRS/> IM: CPT | Performed by: FAMILY MEDICINE

## 2025-04-11 PROCEDURE — 3078F DIAST BP <80 MM HG: CPT | Performed by: FAMILY MEDICINE

## 2025-04-11 PROCEDURE — 3074F SYST BP LT 130 MM HG: CPT | Performed by: FAMILY MEDICINE

## 2025-04-11 PROCEDURE — 99396 PREV VISIT EST AGE 40-64: CPT | Performed by: FAMILY MEDICINE

## 2025-04-11 PROCEDURE — 3008F BODY MASS INDEX DOCD: CPT | Performed by: FAMILY MEDICINE

## 2025-04-11 PROCEDURE — 90471 IMMUNIZATION ADMIN: CPT | Performed by: FAMILY MEDICINE

## 2025-04-11 PROCEDURE — 1036F TOBACCO NON-USER: CPT | Performed by: FAMILY MEDICINE

## 2025-04-11 RX ORDER — FINASTERIDE 1 MG/1
1 TABLET, FILM COATED ORAL
Qty: 90 TABLET | Refills: 1 | Status: SHIPPED | OUTPATIENT
Start: 2025-04-11 | End: 2026-04-11

## 2025-04-11 RX ORDER — ESCITALOPRAM OXALATE 10 MG/1
10 TABLET ORAL DAILY
Qty: 90 TABLET | Refills: 1 | Status: SHIPPED | OUTPATIENT
Start: 2025-04-11

## 2025-04-11 RX ORDER — LISINOPRIL AND HYDROCHLOROTHIAZIDE 10; 12.5 MG/1; MG/1
1 TABLET ORAL DAILY
Qty: 90 TABLET | Refills: 1 | Status: SHIPPED | OUTPATIENT
Start: 2025-04-11

## 2025-04-11 ASSESSMENT — PATIENT HEALTH QUESTIONNAIRE - PHQ9
1. LITTLE INTEREST OR PLEASURE IN DOING THINGS: NOT AT ALL
2. FEELING DOWN, DEPRESSED OR HOPELESS: NOT AT ALL
SUM OF ALL RESPONSES TO PHQ9 QUESTIONS 1 AND 2: 0

## 2025-04-11 ASSESSMENT — ACTIVITIES OF DAILY LIVING (ADL)
GROCERY_SHOPPING: INDEPENDENT
BATHING: INDEPENDENT
TAKING_MEDICATION: INDEPENDENT
DOING_HOUSEWORK: INDEPENDENT
MANAGING_FINANCES: INDEPENDENT
DRESSING: INDEPENDENT

## 2025-04-11 NOTE — PROGRESS NOTES
"    /78   Pulse 83   Temp 36.9 °C (98.4 °F)   Ht 1.88 m (6' 2\")   Wt 93.4 kg (206 lb)   BMI 26.45 kg/m²     Past Medical History:   Diagnosis Date    Anxiety     Hypertension        Patient Active Problem List   Diagnosis    Depression    Fatigue    HLD (hyperlipidemia)    Hypertension    Erectile disorder    Anxiety    Nondependent mixed drug abuse in remission       Current Outpatient Medications   Medication Sig Dispense Refill    aspirin 81 mg EC tablet 1 tablet (81 mg).      busPIRone (Buspar) 15 mg tablet TAKE 1 TABLET BY MOUTH EVERY DAY 90 tablet 1    escitalopram (Lexapro) 10 mg tablet TAKE 1 TABLET BY MOUTH EVERY DAY 90 tablet 0    finasteride (Propecia) 1 mg tablet Take 1 tablet (1 mg) by mouth early in the morning.. 90 tablet 1    lisinopriL-hydrochlorothiazide 10-12.5 mg tablet TAKE 1 TABLET BY MOUTH EVERY DAY 90 tablet 0    tadalafil 20 mg tablet TAKE 1 TABLET BY MOUTH ONCE DAILY AS NEEDED FOR ERECTILE DYSFUNCTION. TAKE 1 HOUR BEFORE NEEDED 30 tablet 10     No current facility-administered medications for this visit.       CC/HPI/ASSESSMENT/PLAN    CC annual wellness visit    HPI patient is 54-year-old male here for wellness visit.  No cognitive deficits noted.  Colonoscopy up-to-date.  Immunizations reviewed.  Patient declines flu shot.  He received a tetanus shot today otherwise up-to-date with vaccines.  Patient notes he is feeling well.  Denies headache fever chest pain palpitation short of breath abdominal pain diarrhea.  Patient currently lives in [place] but flies back home to [place] once a month.  Requesting refills.  ROS negative set note above past medical social history reviewed    Exam calm eyes no jaundice mouth moist throat clear neck supple no LAD goiter no carotid bruit lungs CTA CV RRR Ext no edema skin no rash neuro alert oriented no focal neurologic deficit noted.  No cognitive deficits noted.  Psych: Pleasant male no psychosis    A/P 1.  Annual wellness visit.  No " cognitive deficits noted.  Colonoscopy is up-to-date.  Patient declines flu shot, he received tetanus booster today otherwise up-to-date with vaccines.  Medicines are refilled.  Blood work is ordered.  Follow-up 6 months    There are no diagnoses linked to this encounter.